# Patient Record
Sex: FEMALE | Race: WHITE
[De-identification: names, ages, dates, MRNs, and addresses within clinical notes are randomized per-mention and may not be internally consistent; named-entity substitution may affect disease eponyms.]

---

## 2017-08-08 ENCOUNTER — HOSPITAL ENCOUNTER (OUTPATIENT)
Dept: HOSPITAL 80 - BHFA | Age: 82
End: 2017-08-08
Attending: INTERNAL MEDICINE
Payer: COMMERCIAL

## 2017-08-08 DIAGNOSIS — E78.5: ICD-10-CM

## 2017-08-08 DIAGNOSIS — F17.200: ICD-10-CM

## 2017-08-08 DIAGNOSIS — I71.4: ICD-10-CM

## 2017-08-08 DIAGNOSIS — Z01.810: Primary | ICD-10-CM

## 2018-04-10 ENCOUNTER — HOSPITAL ENCOUNTER (OUTPATIENT)
Dept: HOSPITAL 80 - FIMAGING | Age: 83
End: 2018-04-10
Attending: PHYSICIAN ASSISTANT
Payer: COMMERCIAL

## 2018-04-10 DIAGNOSIS — Z13.6: Primary | ICD-10-CM

## 2018-04-10 DIAGNOSIS — I50.9: ICD-10-CM

## 2018-04-10 DIAGNOSIS — Z87.891: ICD-10-CM

## 2018-04-10 DIAGNOSIS — R41.3: ICD-10-CM

## 2019-01-14 ENCOUNTER — HOSPITAL ENCOUNTER (INPATIENT)
Dept: HOSPITAL 80 - FED | Age: 84
LOS: 3 days | Discharge: NURSING, REHAB OR LONG-TERM CARE | DRG: 871 | End: 2019-01-17
Attending: INTERNAL MEDICINE | Admitting: INTERNAL MEDICINE
Payer: COMMERCIAL

## 2019-01-14 DIAGNOSIS — R18.8: ICD-10-CM

## 2019-01-14 DIAGNOSIS — J96.01: ICD-10-CM

## 2019-01-14 DIAGNOSIS — I50.32: ICD-10-CM

## 2019-01-14 DIAGNOSIS — I48.91: ICD-10-CM

## 2019-01-14 DIAGNOSIS — R33.9: ICD-10-CM

## 2019-01-14 DIAGNOSIS — N13.0: ICD-10-CM

## 2019-01-14 DIAGNOSIS — Z79.01: ICD-10-CM

## 2019-01-14 DIAGNOSIS — E86.0: ICD-10-CM

## 2019-01-14 DIAGNOSIS — R65.20: ICD-10-CM

## 2019-01-14 DIAGNOSIS — J44.9: ICD-10-CM

## 2019-01-14 DIAGNOSIS — K63.1: ICD-10-CM

## 2019-01-14 DIAGNOSIS — E87.1: ICD-10-CM

## 2019-01-14 DIAGNOSIS — A41.9: Primary | ICD-10-CM

## 2019-01-14 DIAGNOSIS — K65.9: ICD-10-CM

## 2019-01-14 DIAGNOSIS — E78.5: ICD-10-CM

## 2019-01-14 DIAGNOSIS — A04.72: ICD-10-CM

## 2019-01-14 DIAGNOSIS — Z66: ICD-10-CM

## 2019-01-14 DIAGNOSIS — N17.9: ICD-10-CM

## 2019-01-14 LAB
INR PPP: 3.14 (ref 0.83–1.16)
PLATELET # BLD: 395 10^3/UL (ref 150–400)
PROTHROMBIN TIME: 32.1 SEC (ref 12–15)

## 2019-01-14 RX ADMIN — SODIUM CHLORIDE SCH MLS: 900 INJECTION, SOLUTION INTRAVENOUS at 19:00

## 2019-01-14 RX ADMIN — VANCOMYCIN HYDROCHLORIDE SCH MG: KIT at 20:05

## 2019-01-14 RX ADMIN — ERTAPENEM SODIUM SCH MLS: 1 INJECTION, POWDER, LYOPHILIZED, FOR SOLUTION INTRAMUSCULAR; INTRAVENOUS at 18:32

## 2019-01-14 NOTE — ECHO
[email or web address]





[address] [address] 

Main: [phone] 



Fax: 



Transthoracic Echocardiogram 

Name:             JACKIE CHRISTIANSON                             MR#:

[id number]

Study Date:       2019                              Study Time:

03:18 PM

YOB: 1932                              Age:

86 year(s)

Height:           175.3 cm (69 in.)                       Weight:

58.97 kg (130 lb.)

BSA:              1.72 m2                                 Gender:

Female

Examination:      Echo                                    Indication:

CHF

Image Quality:    Technically Difficult                   Contrast: 

Requested by:     Joyce Reina                            BP:

105 mmHg/66 mmHg

Heart Rate:                                               Rhythm:

Atrial fibrillation

Indication:       CHF 



Procedure Staff 

Ultrasound Technician:   Tanja Matt [facility] 

Reading Physician:       Shane Cota MD 

Requesting Provider: 



Conclusions:           Normal size left ventricle.  

Mild concentric LV hypertrophy.  

Normal global systolic LV function.  

EF is 60 %.  

Normal RV function.  

The left atrium is severely dilated.  

The right atrium is severely dilated.  

The aortic valve is tri-leaflet.  

Mild aortic cusp calcification is noted.  

Mild aortic valve regurgitation is present.  

 ms.  

Right ventricular systolic pressure measures 29mmHg.  

Moderate tricuspid regurgitation is present.  

No pericardial effusion. 



Measurements: 

Chambers                    Valvular Assessment AV/MV

Valvular Assessment TV/PV



Normal                                   Normal

Normal

Name         Value     Range              Name         Value Range

Name           Value Range

Ao Vesna (MM): 3.4 cm    (2.2 cm-3.7            AV Vmax:     1.69 m/s (1

m/s-1.7       TR Vmax:       2.47 mm/s ( - )



cm)                                  m/s)             TR PGmax:

24 mmHg ( - )

IVSd (2D):   1.2 cm (0.6 cm-1.1               AV maxP mmHg ( -

)         syst. PAP: 29 mmHg  ( - )



cm)                LVOT Vmax:   1.22 m/s (0.7 m/s-1.1 

LVDd (2D):   4.2 cm    (3.9 cm-5.3                              m/s)  



cm)                ERYN (Vmax):  2.7 cm2 ( - )  

LVDs (2D):   2.6 cm    (2.1 cm-4              AR (PHT):    546 ms ( -

)



cm)                MV E Vmax:   0.68 m/s ( - )  

LVPWd (2D):  1.2 cm    ( - )   

LVOTd        2.2 cm    2.2 cm mm 

LVEF (MOD4): 60 %      (>=55 %)   



Patient: JACKIE CHRISTIANSON                          MRN: [id number]

Study Date: 2019   Page 1 of 2

03:18 PM 









RVDd(2D): 2.9 cm (1.9 cm-3.8 

cmmm)  



Continued Measurements: 

Chambers                      Valvular Assessment AV/MV

Valvular Assessment TV/PV



Name                       Value          Name

Value    Name                      Value

LADs Lon.0 cm               AR Vmax:

3.96 cm/s   CVP (est.):             5 mmHg

LA Area:                 34.6 cm2   

LA Volume:               129 ml   

LA Volume Index:         75.0 ml/m2   

RA Area:                 20.9 cm2   



Additional Vessels  



Name                       Value  

Ao Ascending:            3.4 cm    



Findings:              Left Ventricle: 

Normal size left ventricle. Mild concentric LV hypertrophy. Normal

global systolic LV function. EF is

60 %. No regional wall motion abnormality. Unable to assess diastolic

dysfunction.

Right Ventricle: 

Normal size right ventricle. Normal RV function.  

Left Atrium: 

The left atrium is severely dilated.  

Right Atrium: 

The right atrium is severely dilated.  

Mitral Valve: 

There is mild thickening of the mitral valve leaflets. Moderate mitral

annular calcification. Trivial

mitral valve regurgitation. No mitral stenosis is present.  

Aortic Valve: 

The aortic valve is tri-leaflet. Mild aortic cusp calcification is

noted. No aortic valve stenosis is

present. Mild aortic valve regurgitation is present.  ms.  

Tricuspid Valve: 

The tricuspid valve appears normal. Right ventricular systolic

pressure measures 29mmHg. The

pulmonary artery pressure is normal. Moderate tricuspid regurgitation

is present.

Pulmonic Valve: 

Pulmonary valve not well visualized.  

Aorta: 

Normal size aortic root measuring 3.4 cm. Normal size ascending aorta

measuring 3.4 cm.

IVC: 

The IVC is not visualized.  

Pericardium: 

No pericardial effusion. 







Electronically signed by Shane Cota MD on 2019 at 05:21 PM 

(No Signature Object) 



Patient: JACKIE CHRISTIANSON                          MRN: [id number]

Study Date: 2019   Page 2 of 2

03:18 PM 







[id number]

## 2019-01-14 NOTE — EDPHY
H & P


Stated Complaint: Fall, N/V/D.


Time Seen by Provider: 01/14/19 09:50


HPI/ROS: 





CHIEF COMPLAINT:  Diarrhea, syncope





HISTORY OF PRESENT ILLNESS:  86-year-old female with atrial fibrillation 

presents after a syncopal episode.  Onset of multiple episodes of diarrhea 

yesterday.  This morning her daughter helped her to the bathroom.  The patient 

began to feel lightheaded after having diarrhea and had a near syncopal 

episode.  She did not fall or hurt herself.  Started Lasix 2 days ago for 

increasing abdominal girth.  According to the daughter, the patient has had 

gradually increasing confusion over the last week.  She normally takes care of 

herself, tends to her garden and drives.  Hands and feet are normally purple 

for the past year.  The patient has no complaints.  No fever or recent illness.





REVIEW OF SYSTEMS:  complete 10 point ROS reviewed and is negative except for 

the noted elements in the HPI








- Personal History


Current Tetanus Diphtheria and Acellular Pertussis (TDAP): Unsure





- Medical/Surgical History


Hx Asthma: No


Hx Chronic Respiratory Disease: Yes


Hx Diabetes: No


Hx Cardiac Disease: Yes


Hx Renal Disease: No


Hx Cirrhosis: No


Hx Alcoholism: No


Hx HIV/AIDS: No


Hx Splenectomy or Spleen Trauma: No


Other PMH: COPD. Heart failure.





- Social History


Smoking Status: Never smoked


Alcohol Use: None


Drug Use: None





- Physical Exam


Exam: 





General Appearance:  Alert, pleasant


Eyes:  Pupils equal and round, no conjunctival pallor 


ENT, Mouth:  Mucous membranes dry


Neck:  Normal inspection


Respiratory:  Tachypnea, rales at the bases


Cardiovascular:  Irregularly irregular tachycardia


Gastrointestinal:  Abdomen is soft, distended and nontender


Neurological:  A&O, motor 5/5, sensory grossly intact


Skin:  Warm and dry


Extremities:  Hands and feet are purple and cool


Vascular:  1+ peripheral pulses


Psychiatric:  Mood and affect normal





Constitutional: 


 Initial Vital Signs











Temperature (C)  37.2 C   01/14/19 09:54


 


Heart Rate  143 H  01/14/19 09:54


 


Respiratory Rate  15   01/14/19 09:54


 


Blood Pressure  122/66 H  01/14/19 09:54








 











O2 Delivery Mode               Nasal Cannula


 


O2 (L/minute)                  2














Allergies/Adverse Reactions: 


 





No Known Allergies Allergy (Unverified 01/14/19 10:09)


 








Home Medications: 














 Medication  Instructions  Recorded


 


Atorvastatin Calcium [Lipitor 10 10 mg PO DAILY 01/14/19





mg (*)]  


 


Cholecalciferol (Vitamin D3) 4,000 unit PO DAILY 01/14/19





[Vitamin D3]  


 


Cyanocobalamin [Vitamin B12 (*)] 100 mcg PO DAILY 01/14/19


 


Furosemide [Lasix 20 MG (*)] 80 mg PO DAILY 01/14/19


 


Spironolactone [Aldactone 25 MG 25 mg PO DAILY 01/14/19





(*)]  


 


Warfarin Sodium [Coumadin 2.5MG 2.5 mg PO DAILY16 01/14/19





(*)]  














Medical Decision Making





- Diagnostics


EKG Interpretation: 





EKG interpreted by me reveals atrial fibrillation, ventricular rate


ED Course/Re-evaluation: 





This patient presents with significant dehydration and atrial fibrillation with 

RVR.  Hypotensive prior to arrival, now blood pressure 122/80.  IV normal 

saline 500 mL given.  I had a prolonged discussion with the patient's daughter.

  The patient has an advance directive and DNR status.  No recent infection or 

fever.  If heart rate does not decline with IV fluids, will consider IV 

diltiazem.  Labs reveal acute renal failure, consistent with acute renal 

failure.  This is most likely secondary to severe dehydration.  IV fluids 

infusing.  The hospitalist service was consulted for admission.  The patient 

remains asymptomatic and denies any complaints.  Oral fluids given at patient 

request.


noon: HR subsiding with IVF, will continue gentle IVF for now.


2pm: -122, BP 95/62, IVF infusing.  Pt feels much better overall, 

tolerating oral fluids.  Awaiting inpt bed.  


Differential Diagnosis: 





Differential diagnosis includes though is not limited to hypotension, severe 

anemia, UTI, pneumonia, electrolyte abnormality.





- Data Points


Laboratory Results: 


 Laboratory Results





 01/14/19 10:25 





 01/14/19 10:25 








Medications Given: 


 





Sodium Chloride (Ns)  1,000 mls @ 150 mls/hr IV CONT CHRIS


   Stop: 07/13/19 14:44


   Last Admin: 01/15/19 01:08 Dose:  1,000 mls


Ertapenem 0.5 gm/ Sodium (Chloride)  50 mls @ 100 mls/hr IV DAILY CHRIS


   PRN Reason: Protocol


   Stop: 02/13/19 17:44


   Last Admin: 01/15/19 08:41 Dose:  50 mls


Metronidazole/Sodium Chloride (Flagyl 500 Mg (Premix))  100 mls @ 100 mls/hr IV 

Q8HRS CHRIS


   PRN Reason: Protocol


   Stop: 02/13/19 21:59


   Last Admin: 01/15/19 14:25 Dose:  100 mls


Vancomycin HCl (Vancocin Oral Liquid)  125 mg PO QID CHRIS


   PRN Reason: Protocol


   Stop: 02/13/19 20:59


   Last Admin: 01/15/19 16:07 Dose:  125 mg





Discontinued Medications





Sodium Chloride (Ns)  500 mls @ 0 mls/hr IV EDNOW ONE; Wide Open


   PRN Reason: Protocol


   Stop: 01/14/19 09:51


   Last Admin: 01/14/19 10:30 Dose:  500 mls


Sodium Chloride (Ns)  500 mls @ 1,000 mls/hr IV EDNOW ONE


   PRN Reason: Protocol


   Stop: 01/14/19 11:45


   Last Admin: 01/14/19 11:20 Dose:  500 mls


Sodium Chloride (Ns)  1,000 mls @ 3,000 mls/hr IV ONCE ONE


   Stop: 01/14/19 15:10


   Last Admin: 01/14/19 15:23 Dose:  1,000 mls


Sodium Chloride (Ns)  500 mls @ 1,500 mls/hr IV ONCE ONE


   Stop: 01/14/19 18:13


   Last Admin: 01/14/19 18:06 Dose:  500 mls





Point of Care Test Results: 


 Chemistry











  01/14/19





  10:39


 


POC Troponin I  0.05 ng/mL ng/mL





   (0.00-0.08) 














Departure





- Departure


Disposition: [facility]


Clinical Impression: 


 Dehydration, Atrial fibrillation with RVR





Acute renal failure


Qualifiers:


 Acute renal failure type: unspecified Qualified Code(s): N17.9 - Acute kidney 

failure, unspecified





Condition: Serious

## 2019-01-14 NOTE — CPEKG
Test Reason : OPEN

Blood Pressure : ***/*** mmHG

Vent. Rate : 136 BPM     Atrial Rate : 166 BPM

   P-R Int : 207 ms          QRS Dur : 127 ms

    QT Int : 318 ms       P-R-T Axes : 000 046 -71 degrees

   QTc Int : 479 ms

 

Atrial fibrillation

Ventricular premature complex

Right bundle branch block

 

Confirmed by Lyndsay Canales ([id number]) on 1/14/2019 3:39:22 PM

 

Referred By:             Confirmed By:Lyndsay Canales

## 2019-01-14 NOTE — GHP
DATE OF ADMISSION:  2019



CHIEF COMPLAINT:  Weakness, diarrhea.



HPI:  The patient is an 86-year-old with a history significant for COPD, 
peripheral vascular disease, and congestive heart failure, and AFib.  She had a 
bout of diarrhea a week and a half ago for 4 days.  It improved.  However, her 
PCP stopped all of her medications.  This included Lasix, Coumadin, 
simvastatin.  Throughout the week, she has been quite weak, poor p.o. intake, 
more confused than usual.  Her daughter was so worried about her, she has been 
staying with her throughout the week.  She also noted at the end last week her 
abdomen was starting to look more distended, so she called her primary care 
provider on Friday night and resumed Lasix 80 mg Saturday and .   
night she started having multiple bouts of diarrhea, at least 8 per day, and 
brought her into the emergency room this morning after her mother passed out 
after going to the bathroom.  Arriving here, she was in rapid atrial 
fibrillation and hypotensive.  She has been given some IV fluids with some 
improvement in her heart rate.  However, blood pressure is still quite low.  
The patient herself really has minimal complaints.  She has had no fevers, 
chills, cough, urinary symptoms.  Her abdomen is distended, but not tender.  
She does have a history of purple hands and feet that the daughter and patient 
have neglected to follow up with for evaluation.



REVIEW OF SYSTEMS:  A comprehensive review of systems was done and is with 
pertinent positives present in HPI.



PAST MEDICAL HISTORY:  

1.  CHF.  I do not have a recent echocardiogram.  One was done at the PCPs 
office, however, I am unable to get that. 

2.  Atrial fibrillation.  Had been on Coumadin.  However, she is in the middle 
of transitioning over to Eliquis. 

3.  AAA status post endograft. 

4.  Peripheral vascular disease. 

5.  COPD, likely ongoing tobacco use. 

6.  Chronic edema. 

7.  Dyslipidemia.



SOCIAL HISTORY:  She currently lives alone and has been relatively independent 
up until the last couple weeks.  Her daughter is currently living with her.



ALLERGIES:  No known drug allergies.



FAMILY HISTORY:  Parents are .



CURRENT MEDICATIONS:  Please see medicine reconciliation form.



PHYSICAL EXAMINATION:  VITAL SIGNS:  She is afebrile.  Heart rate is initially 
150, currently 120, blood pressure 94/59, respirations 27.  She is 92% on 2 L.  
GENERAL:  She is a debilitated 86-year-old, in no obvious distress.  She is 
alert, but not oriented completely.  HEENT:  Pupils are equal and small.  
Extraocular movements intact.  Mucous membranes are dry.  NECK:  Supple.  No 
adenopathy.  HEART:  Irregularly, irregular with a systolic murmur.  LUNGS:  
Bibasilar rales.  ABDOMEN:  Distended, but nontender.  EXTREMITIES:  No 
significant edema.  Diminished pulses bilaterally.  Her feet are discolored and 
her hands are discolored.  NEUROLOGICALLY:  Her speech is fluent.  She is 
moving all 4 extremities.  MUSCULOSKELETAL:  No deformities.  PSYCH:  Normal 
affect.



LABORATORY DATA:  CBC shows a white count of 19.4, hemoglobin 16.8, with a 
platelet count 395.  Electrolytes are unremarkable, however, CO2 16, with a BUN 
of 103, creatinine of 2.0, glucose of 108.  Troponin is negative.  



Electrocardiogram shows atrial fibrillation with a rapid ventricular response.



ASSESSMENT AND PLAN:  86-year-old presents with 1-2 weeks of increasing 
weakness associated with diarrhea, poor p.o. intake, presenting with renal 
failure, rapid atrial fibrillation, and possible congestive heart failure. 

1.  Diarrhea, unclear etiology.  Will rule out infectious causes.  However, 
certainly severe heart failure could also contribute to diarrhea due to bowel 
ischemia.  We will support her with fluids and workup the rest of her issues. 

2.  Acute renal failure, likely due to dehydration.  However, will check a 
renal ultrasound to rule out hydronephrosis.  Check her urinalysis for possible 
infection, and give her IV fluids gently, given her heart failure. 

3.  Congestive heart failure.  Patient with history congestive heart failure.  
I do not have any more information about that.  Will get an echocardiogram, 
check a BNP, try to get her heart rate under better control.  Further 
information will determine on the echocardiogram. 

4.  Chronic obstructive pulmonary disease.  Patient is not currently on oxygen 
at baseline, however, requires oxygen at this time.  We will check a chest x-
ray. 

5.  Atrial fibrillation with rapid ventricular rate.  She currently is on 
Coumadin.  However, after stopping the Coumadin several days ago, her INR is 
still elevated.  Will repeat that at this time and hold anticoagulation until 
she is in an acceptable range and then start Eliquis per her doctor's request. 

6.  Peripheral vascular disease, status post endograft with diminished pulses 
bilaterally, stable. 

7.  Dyslipidemia.  We will hold her statin at this time, given her other 
medical issues. 

8.  Code status.  Talked with her daughter.  She wishes to be a Do Not 
Resuscitate.  I talked in detail about plan of care.  They would like to get 
more information and rule out infection.  However, if it comes to invasive 
procedures, they would like to talk with the MD POA, her daughter, first.





Job #:  [id number]

MTDD

## 2019-01-14 NOTE — HOSPPROG
Hospitalist Progress Note


Assessment/Plan: 





fu after admit, Pt with cxr showing free air, Abdominal exam is benign however 

given her abdominal distension will have General surgery see and evaluate her.


Will start her on Invanz, continue IV fluids and check an LDH for sepsis.  

Unclear if tachycardia is clearly from AFib or combination of AFib and sepsis.


Patient also has significant urinary retention and a Ramirez catheter was placed.


Heart rate has improved significantly with IV fluids.


Long discussion with family regarding the above findings and plan of care


Greater than 30 min of critical care time spent with patient


Objective: 


 Vital Signs











Temp Pulse Resp BP Pulse Ox


 


 36.6 C   111 H  20   92/58 L  93 


 


 01/14/19 16:57  01/14/19 16:57  01/14/19 16:57  01/14/19 17:20  01/14/19 16:57








 Laboratory Results





 01/14/19 15:18 





 











 01/13/19 01/14/19 01/15/19





 05:59 05:59 05:59


 


Intake Total   2000


 


Balance   2000








 











PT  32.1 SEC (12.0-15.0)  H  01/14/19  15:14    


 


INR  3.14  (0.83-1.16)  H  01/14/19  15:14    














ICD10 Worksheet


Patient Problems: 


 Problems











Problem Status Onset


 


Acute renal failure Acute  


 


Atrial fibrillation with RVR Acute  


 


Dehydration Acute  


 


Free intraperitoneal air Acute

## 2019-01-14 NOTE — SOAPPROG
SOAP Progress Note


Assessment/Plan: 


Assessment:


86 female with free air and wbc 19k but nontender and afebrile, very distended 

and tympanitic


ct shows descending colon obstruction and free air and ascites








pt and family refusing surgery at this time





INR >3























Plan:will observe but risks and options fully discussed including sepsis and 

death





01/14/19 19:41





Objective: 





 Vital Signs











Temp Pulse Resp BP Pulse Ox


 


 36.6 C   111 H  20   92/58 L  93 


 


 01/14/19 16:57  01/14/19 16:57  01/14/19 16:57  01/14/19 17:20  01/14/19 16:57








 Microbiology











 01/14/19 17:30 Gastrointestinal Tract Panel (PCR) - Final





 Stool    Clostridium Difficile Detected








 Laboratory Results





 01/14/19 15:18 





 











 01/13/19 01/14/19 01/15/19





 05:59 05:59 05:59


 


Intake Total   2020


 


Output Total   450


 


Balance   1570








 











PT  32.1 SEC (12.0-15.0)  H  01/14/19  15:14    


 


INR  3.14  (0.83-1.16)  H  01/14/19  15:14    














ICD10 Worksheet


Patient Problems: 


 Problems











Problem Status Onset


 


Free intraperitoneal air Acute  














- ICD10 Problem Qualifiers


(1) Free intraperitoneal air

## 2019-01-15 LAB — PLATELET # BLD: 280 10^3/UL (ref 150–400)

## 2019-01-15 PROCEDURE — 0T9B70Z DRAINAGE OF BLADDER WITH DRAINAGE DEVICE, VIA NATURAL OR ARTIFICIAL OPENING: ICD-10-PCS | Performed by: INTERNAL MEDICINE

## 2019-01-15 RX ADMIN — VANCOMYCIN HYDROCHLORIDE SCH MG: KIT at 12:58

## 2019-01-15 RX ADMIN — VANCOMYCIN HYDROCHLORIDE SCH MG: KIT at 21:46

## 2019-01-15 RX ADMIN — ERTAPENEM SODIUM SCH MLS: 1 INJECTION, POWDER, LYOPHILIZED, FOR SOLUTION INTRAMUSCULAR; INTRAVENOUS at 08:41

## 2019-01-15 RX ADMIN — SODIUM CHLORIDE SCH MLS: 900 INJECTION, SOLUTION INTRAVENOUS at 01:08

## 2019-01-15 RX ADMIN — VANCOMYCIN HYDROCHLORIDE SCH MG: KIT at 16:07

## 2019-01-15 RX ADMIN — VANCOMYCIN HYDROCHLORIDE SCH MG: KIT at 05:35

## 2019-01-15 NOTE — PDMN
Medical Necessity


Medical necessity: Pt meets IP criteria as of 1/14/2019 per MD and MCG M-160 (

sepsis); los > 2mn for ongoing tx and management of sepsis with hypotension and 

tachycardia in the setting of likely bowel obstruction with likely perforation 

as well as C.diff infection and afib.

## 2019-01-15 NOTE — SOAPPROG
SOAP Progress Note


Assessment/Plan: 


Assessment/Plan: 86 Y F c c dif colitis, free air, likely colonic perforation. 

Afib, tachycardia, hypotensive, KONSTANTIN. 





Some improvement with fluid resuscitation, IV and PO flagyl and ertapenem. Cr 

now 1.7. HR still elevated but improved. Patient and daughter politely refusing 

surgical intervention. They understand that the situation is grave and 

potentially deadly without surgery. Per daughter, they have met with palliative 

care. Will be here if they change mind and wish to pursue surgery. 





Patient sleeping. Did not awaken. Spoke with daughter, POA, bedside. 





01/15/19 15:02





Objective: 





 Vital Signs











Temp Pulse Resp BP Pulse Ox


 


 36.5 C   109 H  18   93/56 L  98 


 


 01/15/19 12:00  01/15/19 12:00  01/15/19 12:00  01/15/19 12:00  01/15/19 12:00








 Microbiology











 01/14/19 17:30 Gastrointestinal Tract Panel (PCR) - Final





 Stool    Clostridium Difficile Detected








 Laboratory Results





 01/15/19 03:56 





 01/15/19 03:56 





 











 01/14/19 01/15/19 01/16/19





 05:59 05:59 05:59


 


Intake Total  2020 


 


Output Total  450 425


 


Balance  1570 -425








 











PT  32.1 SEC (12.0-15.0)  H  01/14/19  15:14    


 


INR  3.14  (0.83-1.16)  H  01/14/19  15:14    














ICD10 Worksheet


Patient Problems: 


 Problems











Problem Status Onset


 


Free intraperitoneal air Acute

## 2019-01-15 NOTE — HOSPPROG
Hospitalist Progress Note


Assessment/Plan: 





fu after admit, Pt with cxr showing free air, Abdominal exam is benign however 

given her abdominal distension will have General surgery see and evaluate her.


Will start her on Invanz, continue IV fluids and check an LDH for sepsis.  

Unclear if tachycardia is clearly from AFib or combination of AFib and sepsis.


Patient also has significant urinary retention and a Ramirez catheter was placed.


Heart rate has improved significantly with IV fluids.


Long discussion with family regarding the above findings and plan of care


Greater than 30 min of critical care time spent with patient


Objective: 


 Vital Signs











Temp Pulse Resp BP Pulse Ox


 


 36.4 C   105 H  19   85/54 L  98 


 


 01/15/19 07:23  01/15/19 07:23  01/15/19 04:00  01/15/19 07:23  01/15/19 07:23








 Microbiology











 01/14/19 17:30 Gastrointestinal Tract Panel (PCR) - Final





 Stool    Clostridium Difficile Detected








 Laboratory Results





 01/15/19 03:56 





 01/15/19 03:56 





 











 01/14/19 01/15/19 01/16/19





 05:59 05:59 05:59


 


Intake Total  2020 


 


Output Total  450 425


 


Balance  1570 -425








 











PT  32.1 SEC (12.0-15.0)  H  01/14/19  15:14    


 


INR  3.14  (0.83-1.16)  H  01/14/19  15:14    














ICD10 Worksheet


Patient Problems: 


 Problems











Problem Status Onset


 


Free intraperitoneal air Acute

## 2019-01-15 NOTE — HOSPPROG
Hospitalist Progress Note


Assessment/Plan: 


86-year-old with COPD and ongoing tobacco use is admitted with several days of 

intermittent diarrhea, lightheadedness and was found to be in rapid AFib and 

hypotension.  Evaluation revealed free air and fluid in her abdomen with 

minimal tenderness.  She also has C diff colitis.





#  C diff colitis in the setting of a likely bowel obstruction and free air.  I 

suspect the PO Vanco may not be absorbed well and IV Flagyl was added to her 

regimen.  Her diarrhea is much improved today and her family would like to 

treat the C diff for comfort measures.


* Continue IV Flagyl for now, Discussed with ID who has no specific 

recommendations in this case


* IV flagyl til dc then transition to PO vanco at 250 qid and immodium as 

needed.


* Diarrhea is improved





#  free air noted in the abdomen with fluid.  CT scan concerning for bowel 

obstruction and likely perforation at that area.  Case discussed in detail with 

surgery who met with the patient and the patient is declining any surgical 

intervention.  I had a long discussion with the patient and daughter regarding 

the poor prognosis of this issue with or without surgical intervention.  They 

are considering palliative care and hospice but are interested in treating the 

C diff colitis to avoid further diarrhea.


* Continue to treat C diff with IV Flagyl while in hospital


* treat peritonitis with ceftriaxone while in hospital and dc abx when dc with 

hospice.


* Discussed with ID who agrees with plan (no formal consult).


* Hospice consult in the morning for dc planning.  If decompensates overnight, 

the family is aware that she might die





#  sepsis in the setting of free air and peritonitis with minimal pain.  

Patient is hypotensive and tachycardic (due to AFib) given the fact she does 

not want definitive treatment with surgery I will not start her on the sepsis 

protocol but will treat her with antibiotics and supportive care with IV fluids 

as needed.  She is a do not resuscitate and the family does not want 

extraordinary measures.  


* Decrease fluid slightly and watch blood pressure


* Family aware of possibility of death and agrees to make her comfortable 

tonight if she drops her pressure and not transfer to icu





#  AFib with rapid ventricular rate likely secondary to her above issues.


* will follow, no treatment at this time, asymptomatic.





#  COPD, patient states she quit smoking but the daughter think she is still 

smoking some.


* Follow clinically, respiratory status is relatively stable





#  AAA s/p endograft.





Patient and family well aware of her very poor prognosis the goal is comfort 

and treatment of the C diff colitis. Family meeting with hospice tomorrow and 

will determine dc plans.  [facility]ible SN[facility] with hosp[facility]  


Subjective: Long discussion with patient and daughter regarding her prognosis 

in status.  She continues to be comfortable but feels a little bit confused and 

has difficulty focusing on her conversation no abdominal pain


Objective: 


 Vital Signs











Temp Pulse Resp BP Pulse Ox


 


 36.5 C   109 H  18   93/56 L  98 


 


 01/15/19 12:00  01/15/19 12:00  01/15/19 12:00  01/15/19 12:00  01/15/19 12:00








 Microbiology











 01/14/19 17:30 Gastrointestinal Tract Panel (PCR) - Final





 Stool    Clostridium Difficile Detected








 Laboratory Results





 01/15/19 03:56 





 01/15/19 03:56 





 











 01/14/19 01/15/19 01/16/19





 05:59 05:59 05:59


 


Intake Total  2020 


 


Output Total  450 425


 


Balance  1570 -425








 











PT  32.1 SEC (12.0-15.0)  H  01/14/19  15:14    


 


INR  3.14  (0.83-1.16)  H  01/14/19  15:14    














- Physical Exam


Constitutional: not in pain, chronically ill appearing


Eyes: PERRL


Ears, Nose, Mouth, Throat: moist mucous membranes


Cardiovascular: irregularly irregular, tachycardia


Respiratory: no respiratory distress, reduced air movement


Gastrointestinal: distension, other (Umbilical hernia noted), No tenderness (No 

tenderness to deep palpation abdomen quite distended on exam)


Genitourinary: kurtz in urethra


Skin: No normal color (Pale)


Musculoskeletal: generalized weakness


Neurologic: No facial droop


Psychiatric: interacting appropriately





ICD10 Worksheet


Patient Problems: 


 Problems











Problem Status Onset


 


Free intraperitoneal air Acute  


 


Acute renal failure Acute  


 


Dehydration Acute  


 


Atrial fibrillation with RVR Acute

## 2019-01-15 NOTE — ASMTCMCOM
CM Note

 

CM Note                       

Notes:

1/15/2019 Case Management Note



Pt admitted for syncope and malaise.  Hospice consult ordered. Taye from Palliative team met w/pt 

pt and children.  Meredith [phone] and Bola [phone].  Please see note for details.



Faxed referral to [facility] SNF.  Family aware they will private pay for a room.  Bola to activate 


long term care insurance policy.



Discussed hospice agencies.  Faxed referral to [facility] and [facility].  Requested meetings with 

family tomorrow between 9 am and 1 pm.



Case Management d/c poc:  to be determined.  SNF with hospice



Case Management to follow.

 

Date Signed:  01/15/2019 04:19 PM

Electronically Signed By:Deisy Mora RN

## 2019-01-16 RX ADMIN — VANCOMYCIN HYDROCHLORIDE SCH MG: KIT at 05:46

## 2019-01-16 RX ADMIN — VANCOMYCIN HYDROCHLORIDE SCH MG: KIT at 22:18

## 2019-01-16 RX ADMIN — VANCOMYCIN HYDROCHLORIDE SCH MG: KIT at 11:35

## 2019-01-16 RX ADMIN — SODIUM CHLORIDE SCH MLS: 900 INJECTION, SOLUTION INTRAVENOUS at 07:19

## 2019-01-16 RX ADMIN — VANCOMYCIN HYDROCHLORIDE SCH MG: KIT at 16:09

## 2019-01-16 NOTE — HOSPPROG
Hospitalist Progress Note


Assessment/Plan: 





87yo F with COPD presented with diarrhea found to have C diff as well as free 

air in abdomen. She is also hypotensive and in afib with RVR. She is declining 

any aggressive measures or surgical intervention and now pursuing hospice.





1. Suspected bowel perforation: With free intra-abdominal air on imaging.


   - Surgery following. Patient not wanting any surgical intervention


   - Discontinue IV ceftriaxone and flagyl (no longer has IV access and not 

wanting additional IV)


2. C diff colitis


   - Continue PO vancomycin to complete course


3. Atrial fibrillation with RVR: Due to above. She is asymptomatic.


   - No treatment at this time. 


   - Stopped warfarin


4. Sepsis with hypotension: BP ok but low. She denies dizziness.


   - She is ok with receiving IV fluids but does not want extraordinary measures


5. COPD: No exacerbation. 


6. AAA s/p endograft.





Goals of care: She and her family met with hospice today. This is in line with 

her goals and she has decision making capacity. Family looking at SNF.





Dispo: Remain inpatient, possibly discharge tomorrow to SNF with hospice.





Code: DNR/DNI 


Subjective: No abdominal pain. Eating breakfast. No change since yesterday. 

Still not wanting any aggressive measures/surgery.


Objective: 


 Vital Signs











Temp Pulse Resp BP Pulse Ox


 


 36.2 C   102 H  20   97/61 L  93 


 


 01/16/19 07:40  01/16/19 07:40  01/16/19 07:40  01/16/19 07:40  01/16/19 07:40








 Laboratory Results





 01/15/19 03:56 





 01/15/19 03:56 





 











 01/15/19 01/16/19 01/17/19





 05:59 05:59 05:59


 


Intake Total  1736 


 


Output Total  1175 


 


Balance  561 








 











PT  32.1 SEC (12.0-15.0)  H  01/14/19  15:14    


 


INR  3.14  (0.83-1.16)  H  01/14/19  15:14    














- Physical Exam


Constitutional: no apparent distress, other (elderly, frail)


Eyes: anicteric sclera


Ears, Nose, Mouth, Throat: moist mucous membranes, hearing normal, ears appear 

normal, no oral mucosal ulcers


Cardiovascular: tachycardia, No edema


Respiratory: no respiratory distress, no rales or rhonchi, clear to auscultation


Gastrointestinal: distension, No tenderness, No guarding


Genitourinary: no bladder fullness, no bladder tenderness, no renal bruits


Skin: no rashes or abrasions, no fluctuance, no induration


Musculoskeletal: generalized weakness


Neurologic: AAOx3


Psychiatric: interacting appropriately





ICD10 Worksheet


Patient Problems: 


 Problems











Problem Status Onset


 


Acute renal failure Acute  


 


Atrial fibrillation with RVR Acute  


 


Dehydration Acute  


 


Free intraperitoneal air Acute

## 2019-01-16 NOTE — SOAPPROG
SOAP Progress Note


Assessment/Plan: 


Assessment/Plan: 86 Y F c c dif colitis, free air, likely colonic perforation. 

Afib, tachycardia, hypotensive, KONSTANTIN. 





Checked in again today. Patient still not wanting surgery. We will continue to 

follow from a distance but please contact us if patient and family change their 

minds. She is very lucid and is very aware that this is life threatening..."I'm 

86," she says as she shrugs shoulders. Thanks. 





alert, oriented


abd still distended, still NT, maybe slightly softer





01/16/19 08:59





Objective: 





 Vital Signs











Temp Pulse Resp BP Pulse Ox


 


 36.2 C   102 H  20   97/61 L  93 


 


 01/16/19 07:40  01/16/19 07:40  01/16/19 07:40  01/16/19 07:40  01/16/19 07:40








 Laboratory Results





 01/15/19 03:56 





 01/15/19 03:56 





 











 01/15/19 01/16/19 01/17/19





 05:59 05:59 05:59


 


Intake Total  1736 


 


Output Total  1175 


 


Balance  561 








 











PT  32.1 SEC (12.0-15.0)  H  01/14/19  15:14    


 


INR  3.14  (0.83-1.16)  H  01/14/19  15:14    














ICD10 Worksheet


Patient Problems: 


 Problems











Problem Status Onset


 


Acute renal failure Acute  


 


Atrial fibrillation with RVR Acute  


 


Dehydration Acute  


 


Free intraperitoneal air Acute

## 2019-01-17 VITALS — SYSTOLIC BLOOD PRESSURE: 99 MMHG | DIASTOLIC BLOOD PRESSURE: 76 MMHG

## 2019-01-17 LAB — PLATELET # BLD: 243 10^3/UL (ref 150–400)

## 2019-01-17 RX ADMIN — VANCOMYCIN HYDROCHLORIDE SCH MG: KIT at 06:21

## 2019-01-17 RX ADMIN — VANCOMYCIN HYDROCHLORIDE SCH MG: KIT at 12:42

## 2019-01-17 NOTE — ASMTLACE
LACE

 

Length of stay for            Answers:  4-6 days                              

current admission                                                             

Acuity / Level of             Answers:  Yes                                   

Care: Did the patient                                                         

have an inpatient                                                             

admission?                                                                    

Comorbidities - select        Answers:  Chronic pulmonary disease             

all that apply                                                                

                                        Congestive heart failure              

                                        Peripheral vascular                   

                                        disease                               

                                        Other                         Notes:  AFib; Dyslipidemia

# of Emergency department     Answers:  1-2                                   

visits in the last 6                                                          

months                                                                        

Score: 14

 

Date Signed:  01/17/2019 01:29 PM

Electronically Signed By:Margie Rhodes LCSW

## 2019-01-17 NOTE — PDDCSUM
Discharge Summary


Discharge Summary: 





Date of Admission: 2019


Date of Discharge: 2019





Consultants: general surgery





Studies/Procedures:


1. TTE - LVEF 60%, normal RV function, severely dilated LA and RA, RVSP 29mmHg, 

moderate TR


2. Abdominal US - mild left hydronephrosis, very dilated loops of bowels 

throughout abdomen, small amount of ascites


3. CT abdomen/pelvis without contrast - large amount of free air and free fluid 

in abdomen and pelvis, likely related to bowel obstruction involving the 

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





Discharge Diagnoses:


1. Suspected colonic perforation with free abdominal air, related to


2. Large bowel obstruction, in setting of 


3. C difficile colitis


4. Severe sepsis with fluid responsive hypotension


5. Atrial fibrillation with RVR currently not on anticoagulation


6. Acute hypoxemic respiratory failure


7. Acute kidney injury


8. Hypovolemic hyponatremia


9. Chronic diastolic CHF


10. COPD


11. AAA s/p endograft


12. ? Bilateral UPJ obstructions, urinating ok without kurtz catheter





Brief Hospital Course:


87yo F with COPD, Afib on warfarin, chronic diastolic CHF presented with 

diarrhea found to have C diff as well as a large amount of free air in abdomen. 

She was meeting severe sepsis criteria with KONSTANTIN (Cr 2.0), hypotension, and 

developed atrial fibrillation with HR in the 140-150s. She was started on IV 

ceftriaxone, IV metronidazole, and PO vancomycin. General surgery was 

consulted. Numerous conversations were had with patient and family and patient 

elected to not have surgery. She did not want any aggressive measures besides 

IV antibiotics and IV fluids. She did not want aggressive measures or new 

medications to control her afib. Fortunately, she clinically improved with 

surgical intervention. Her hemodynamics were stable, creatinine down to 1.1, 

and abdominal distention had improved. She was still passing flatus at 

discharge. 





The patient and family met with 3 hospice services and decided on pursuing [facility]. She is being discharged to [facility]. The family is hopeful that she 

may be able to "graduate" from hospice if she continues to improve but are 

cautiously optimistic given her clinical situation. She is a DNR/DNI.





Regarding her atrial fibrillation, we have decided to hold all anticoagulation 

for now due to risk of GI bleeding and fall risk. She should follow up with PCP 

regarding resuming this in the future.





Lastly, she is requiring 4-5L of O2. This is partly due to mild pulmonary edema 

in setting of aggressive fluid resuscitation. We have resumed her diuretics at 

a lower dose. She was not having a COPD exacerbation.





Medications: Please refer to EMR for complete list. Changes made this admission 

include the followin. Stopped warfarin and spironolactone.


2. Decreased lasix from 80 to 40mg QD


3. Started PO vancomycin 125mg QID through 





Follow Up Plan:


1. Discharging to [facility] with hospice services





Physical Exam: Vitals reviewed, afebrile, HR low 100s, SBP 100s. Alert and 

oriented, no focal neuro deficits, irregularly irregular and slightly 

tachycardic, lungs with bibasilar decreased breath sounds, abdomen distended 

but minimally tender, decreased bowel sounds, no rash or edema.

## 2019-01-17 NOTE — ASMTDCNOTE
Case Management Discharge

 

Discharge Order Complete?     Answers:  Yes                                   

Patient to Obtain             Answers:  Other                         Notes:  [facility], [facility]


Medications                                                                   

Transportation Arranged       Answers:  [facility] Stretcher                         

Transport will Pick (Date     01/17/2019 12:00 AM

& Time)                       

Case Management Transport     Answers:  Yes                           Notes:  PCS for [facility]

Form Complete                                                                 

Faxed Final Orders            Answers:  Yes                           Notes:  [facility], [facility]

Agency/Facility Transfer      Answers:  Yes                           Notes:  [facility], [facility]

Report Printed & Faxed to                                                     

Receiving Agency                                                              

Family Notified               Answers:  Yes                           Notes:  daughter, Meredith

Discharge Comments            

Notes:

Patient to discharge today to [facility] with [facility] support. Discharge summaries Allscripted 

to both [facility] and [facility].Transport arranged with [facility], patient going stretcher with 4L O2, at 

15:30 today(1 -17-19). [facility] and [facility] have been notified. No further needs.

 

Date Signed:  01/17/2019 01:10 PM

Electronically Signed By:Margie Rhodes LCSW

## 2019-01-17 NOTE — PDIAF
- Diagnosis


Code Status: Do Not Resuscitate





- Medication Management


Additional Medication Instructions: Stopped warfarin and spironolactone.  

Continue PO vancomycin 125mg QID through 1/24/2019.  Continue lasix 40mg QD.  

Start oxycodone 5mg PO Q4H PRN.


Discharge Medications: electronically signed and located in the Home Medication 

List.


PICC Care - Routine: N/A





- Orders


Isolation Type: CDIFF Isolation


Diet Recommendation: no restrictions on diet


Ramirez: Not applicable


Additional Instructions: 


The following medications were changed:


   - Decreased your lasix to 40mg once daily


   - Stopped your warfarin


   - You should continue oral vancomycin 125mg four times daily through 1/24/ 2019


   - I stopped your spironolactone (will lower your blood pressure)


   - I have ordered oxycodone 5mg every 4 hours as needed for pain











- Follow Up Care


Current Providers and Referrals: 


Patient,NotPresent [Unknown] - As per Instructions

## 2019-01-17 NOTE — PDIAF
- Diagnosis


Code Status: Do Not Resuscitate





- Medication Management


Additional Medication Instructions: Stopped warfarin and spironolactone.  

Continue PO vancomycin 125mg QID through 1/24/2019.  Continue lasix 40mg QD.  

Start oxycodone 5mg PO Q4H PRN.


Discharge Medications: electronically signed and located in the Home Medication 

List.


PICC Care - Routine: N/A





- Orders


Isolation Type: CDIFF Isolation


Diet Recommendation: no restrictions on diet


Ramirez: Yes (Placed 1/15/2019.)


Additional Instructions: 


The following medications were changed:


   - Decreased your lasix to 40mg once daily


   - Stopped your warfarin


   - You should continue oral vancomycin 125mg four times daily through 1/24/ 2019


   - I stopped your spironolactone (will lower your blood pressure)


   - I have ordered oxycodone 5mg every 4 hours as needed for pain











- Follow Up Care


Current Providers and Referrals: 


Patient,NotPresent [Unknown] - As per Instructions

## 2019-01-17 NOTE — ASDISCHSUM
----------------------------------------------

Discharge Information

----------------------------------------------

Plan Status:SNF                                      Medically Cleared to Leave:01/16/2019

Discharge Date:01/16/2019                            CM D/C Disposition:Skilled Nursing Facility

ADT D/C Disposition:Skilled Nursing Facility         Projected Discharge Date:01/17/2019 11:00 AM

Transportation at D/C:Other                          Discharge Delay Reason:

Follow-Up Date:01/17/2019 11:00 AM                   Discharge Slot:2 - 12:01 pm - 18:00 pm

Final Diagnosis:AFIB, Renal failure, dehydration

----------------------------------------------

Placement Information

----------------------------------------------

Referral Type:*Hospice                               Referral ID:[id number]

Provider Name:[facility]

Address 1:[address] [place]                       Phone [phone]

Address 2:                                           Fax [phone]

City:[place]                                       Selection Factors:

State:[place]

 

Referral Type:*Nursing Home/SNF                      Referral ID:[id number]

Provider Name:[facility]

Address 1:[address]                             Phone [phone]

Address 2:                                           Fax [phone]

City:[place]                                         Selection Factors:

State:[place]

 

----------------------------------------------

Patient Contact Information

----------------------------------------------

Contact Name:STEPHEN                             Relationship:Daughter

Address:                                             Home [phone]

                                                     Work [phone]

City:                                                [facility] Phone:

State/Zip Code:                                      Email:

----------------------------------------------

Financial Information

----------------------------------------------

Financial Class:Medicare

Primary Plan Desc:MEDICARE INPATIENT                 Primary Plan Number:[id number]

Secondary Plan Desc:STEPHANIE BHAGAT                      Secondary Plan Number:[id number]

 

 

----------------------------------------------

Assessment Information

----------------------------------------------

----------------------------------------------

[facility] CM Progress Note

----------------------------------------------

CM Note

 

CM Note                       

Notes:

1/15/2019 Case Management Note



Pt admitted for syncope and malaise.  Hospice consult ordered. Taye from Palliative team met w/pt 

pt and children.  Meredith [phone] and Bola [phone].  Please see note for details.



Faxed referral to [facility].  Family aware they will private pay for a room.  Bola to activate 


long term care insurance policy.



Discussed hospice agencies.  Faxed referral to [facility] and [facility].  Requested meetings with 

family tomorrow between 9 am and 1 pm.



Case Management d/c poc:  to be determined.  SNF with hospice



Case Management to follow.

 

Date Signed:  01/15/2019 04:19 PM

Electronically Signed By:Deisy Mora RN

 

 

----------------------------------------------

Case Management Discharge Plan Note

----------------------------------------------

Case Management Discharge

 

Discharge Order Complete?     Answers:  Yes                                   

Patient to Obtain             Answers:  Other                         Notes:  [facility], [facility]


Medications                                                                   

Transportation Arranged       Answers:  [facility] Stretcher                         

Transport will Pick (Date     01/17/2019 12:00 AM

& Time)                       

Case Management Transport     Answers:  Yes                           Notes:  PCS for [facility]

Form Complete                                                                 

Faxed Final Orders            Answers:  Yes                           Notes:  [facility], [facility]

Agency/Facility Transfer      Answers:  Yes                           Notes:  [facility], [facility]

Report Printed & Faxed to                                                     

Receiving Agency                                                              

Family Notified               Answers:  Yes                           Notes:  daughter, Meredith

Discharge Comments            

Notes:

Patient to discharge today to [facility] with [facility] support. Discharge summaries Allscripted 

to both [facility] and [facility].Transport arranged with [facility], patient going stretcher with 4L O2, at 

15:30 today(1 -17-19). [facility] and [facility] have been notified. No further needs.

 

Date Signed:  01/17/2019 01:10 PM

Electronically Signed By:Margie Rhodes LCSW

 

 

----------------------------------------------

Intervention Information

----------------------------------------------